# Patient Record
Sex: MALE | Race: WHITE | NOT HISPANIC OR LATINO | Employment: FULL TIME | URBAN - METROPOLITAN AREA
[De-identification: names, ages, dates, MRNs, and addresses within clinical notes are randomized per-mention and may not be internally consistent; named-entity substitution may affect disease eponyms.]

---

## 2019-01-06 ENCOUNTER — APPOINTMENT (OUTPATIENT)
Dept: RADIOLOGY | Facility: MEDICAL CENTER | Age: 24
End: 2019-01-06
Attending: EMERGENCY MEDICINE
Payer: COMMERCIAL

## 2019-01-06 ENCOUNTER — HOSPITAL ENCOUNTER (EMERGENCY)
Facility: MEDICAL CENTER | Age: 24
End: 2019-01-06
Attending: EMERGENCY MEDICINE
Payer: COMMERCIAL

## 2019-01-06 VITALS
BODY MASS INDEX: 17.54 KG/M2 | HEIGHT: 75 IN | RESPIRATION RATE: 16 BRPM | HEART RATE: 66 BPM | TEMPERATURE: 98.2 F | OXYGEN SATURATION: 100 % | WEIGHT: 141.09 LBS | DIASTOLIC BLOOD PRESSURE: 65 MMHG | SYSTOLIC BLOOD PRESSURE: 122 MMHG

## 2019-01-06 DIAGNOSIS — G43.009 MIGRAINE WITHOUT AURA AND WITHOUT STATUS MIGRAINOSUS, NOT INTRACTABLE: ICD-10-CM

## 2019-01-06 DIAGNOSIS — R51.9 NONINTRACTABLE EPISODIC HEADACHE, UNSPECIFIED HEADACHE TYPE: ICD-10-CM

## 2019-01-06 LAB
ALBUMIN SERPL BCP-MCNC: 4.3 G/DL (ref 3.2–4.9)
ALBUMIN/GLOB SERPL: 1.4 G/DL
ALP SERPL-CCNC: 78 U/L (ref 30–99)
ALT SERPL-CCNC: 20 U/L (ref 2–50)
ANION GAP SERPL CALC-SCNC: 10 MMOL/L (ref 0–11.9)
AST SERPL-CCNC: 23 U/L (ref 12–45)
BASOPHILS # BLD AUTO: 0.9 % (ref 0–1.8)
BASOPHILS # BLD: 0.07 K/UL (ref 0–0.12)
BILIRUB SERPL-MCNC: 1.3 MG/DL (ref 0.1–1.5)
BUN SERPL-MCNC: 14 MG/DL (ref 8–22)
CALCIUM SERPL-MCNC: 9.3 MG/DL (ref 8.4–10.2)
CHLORIDE SERPL-SCNC: 105 MMOL/L (ref 96–112)
CO2 SERPL-SCNC: 20 MMOL/L (ref 20–33)
CREAT SERPL-MCNC: 0.89 MG/DL (ref 0.5–1.4)
EOSINOPHIL # BLD AUTO: 0.12 K/UL (ref 0–0.51)
EOSINOPHIL NFR BLD: 1.5 % (ref 0–6.9)
ERYTHROCYTE [DISTWIDTH] IN BLOOD BY AUTOMATED COUNT: 37.5 FL (ref 35.9–50)
GLOBULIN SER CALC-MCNC: 3 G/DL (ref 1.9–3.5)
GLUCOSE SERPL-MCNC: 80 MG/DL (ref 65–99)
HCT VFR BLD AUTO: 42.4 % (ref 42–52)
HGB BLD-MCNC: 14.9 G/DL (ref 14–18)
IMM GRANULOCYTES # BLD AUTO: 0.02 K/UL (ref 0–0.11)
IMM GRANULOCYTES NFR BLD AUTO: 0.2 % (ref 0–0.9)
LYMPHOCYTES # BLD AUTO: 1.72 K/UL (ref 1–4.8)
LYMPHOCYTES NFR BLD: 21 % (ref 22–41)
MCH RBC QN AUTO: 28 PG (ref 27–33)
MCHC RBC AUTO-ENTMCNC: 35.1 G/DL (ref 33.7–35.3)
MCV RBC AUTO: 79.7 FL (ref 81.4–97.8)
MONOCYTES # BLD AUTO: 1 K/UL (ref 0–0.85)
MONOCYTES NFR BLD AUTO: 12.2 % (ref 0–13.4)
NEUTROPHILS # BLD AUTO: 5.28 K/UL (ref 1.82–7.42)
NEUTROPHILS NFR BLD: 64.2 % (ref 44–72)
NRBC # BLD AUTO: 0 K/UL
NRBC BLD-RTO: 0 /100 WBC
PLATELET # BLD AUTO: 235 K/UL (ref 164–446)
PMV BLD AUTO: 10.3 FL (ref 9–12.9)
POTASSIUM SERPL-SCNC: 3.5 MMOL/L (ref 3.6–5.5)
PROT SERPL-MCNC: 7.3 G/DL (ref 6–8.2)
RBC # BLD AUTO: 5.32 M/UL (ref 4.7–6.1)
SODIUM SERPL-SCNC: 135 MMOL/L (ref 135–145)
WBC # BLD AUTO: 8.2 K/UL (ref 4.8–10.8)

## 2019-01-06 PROCEDURE — A9270 NON-COVERED ITEM OR SERVICE: HCPCS | Performed by: EMERGENCY MEDICINE

## 2019-01-06 PROCEDURE — 36415 COLL VENOUS BLD VENIPUNCTURE: CPT

## 2019-01-06 PROCEDURE — 700111 HCHG RX REV CODE 636 W/ 250 OVERRIDE (IP): Performed by: EMERGENCY MEDICINE

## 2019-01-06 PROCEDURE — 99284 EMERGENCY DEPT VISIT MOD MDM: CPT

## 2019-01-06 PROCEDURE — 80053 COMPREHEN METABOLIC PANEL: CPT

## 2019-01-06 PROCEDURE — 70450 CT HEAD/BRAIN W/O DYE: CPT

## 2019-01-06 PROCEDURE — 72125 CT NECK SPINE W/O DYE: CPT

## 2019-01-06 PROCEDURE — 85025 COMPLETE CBC W/AUTO DIFF WBC: CPT

## 2019-01-06 PROCEDURE — 700102 HCHG RX REV CODE 250 W/ 637 OVERRIDE(OP): Performed by: EMERGENCY MEDICINE

## 2019-01-06 RX ORDER — OXYCODONE HYDROCHLORIDE AND ACETAMINOPHEN 5; 325 MG/1; MG/1
1 TABLET ORAL ONCE
Status: COMPLETED | OUTPATIENT
Start: 2019-01-06 | End: 2019-01-06

## 2019-01-06 RX ORDER — CETIRIZINE HYDROCHLORIDE, PSEUDOEPHEDRINE HYDROCHLORIDE 5; 120 MG/1; MG/1
1 TABLET, FILM COATED, EXTENDED RELEASE ORAL ONCE
COMMUNITY

## 2019-01-06 RX ORDER — ONDANSETRON 4 MG/1
4 TABLET, ORALLY DISINTEGRATING ORAL ONCE
Status: COMPLETED | OUTPATIENT
Start: 2019-01-06 | End: 2019-01-06

## 2019-01-06 RX ORDER — ZINC ACETATE ANHYDROUS AND ZINC GLUCONATE 2; 1 [HP_X]/1; [HP_X]/1
1 TABLET ORAL ONCE
COMMUNITY

## 2019-01-06 RX ORDER — IBUPROFEN 200 MG
600 TABLET ORAL EVERY 6 HOURS PRN
COMMUNITY

## 2019-01-06 RX ADMIN — ONDANSETRON 4 MG: 4 TABLET, ORALLY DISINTEGRATING ORAL at 12:34

## 2019-01-06 RX ADMIN — OXYCODONE HYDROCHLORIDE AND ACETAMINOPHEN 1 TABLET: 5; 325 TABLET ORAL at 12:34

## 2019-01-06 ASSESSMENT — PAIN SCALES - GENERAL
PAINLEVEL_OUTOF10: 3
PAINLEVEL_OUTOF10: 4

## 2019-01-06 NOTE — ED NOTES
All lines and monitors D/Cd.  Discharge instructions given, questions answered.  Left ER on foot escorted by RN. Pt states all belongings in possession.  Instructed not to drive after pain medication and pt verbalizes understanding.

## 2019-01-06 NOTE — ED NOTES
Med rec completed per pt and mom at bedside  Allergies reviewed - NKDA  No ABX in last 30 days   Pt denies taking any prescription medications

## 2019-01-06 NOTE — ED PROVIDER NOTES
"ED Provider Note  CHIEF COMPLAINT  Chief Complaint   Patient presents with   • Headache       HPI  Orestes Lopez is a 23 y.o. male who presents with intermittent mild to moderate occipital headache over the past several days.  No fever nor stiff neck.  No sinus pain or earache or sore throat.  No trauma.  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  No chest pain or nausea or vomiting.  No cough.  No weakness in his arms or legs.  Pain starts at the top of the neck up into the back of his head.  No stiff neck.  No previous evaluation by a physician.    REVIEW OF SYSTEMS  No chest pain, no difficulty breathing, no nausea or vomiting.  ALL OTHER SYSTEMS NEGATIVE    ALLERGIES  No Known Allergies    CURRENT MEDICATIONS  Negative    PAST MEDICAL HISTORY  Negative    FAMILY HISTORY  Negative    SOCIAL HISTORY  Mother at the bedside    PHYSICAL EXAM  GENERAL: Alert young male adult  VITAL SIGNS: /64   Pulse 78   Temp 36.8 °C (98.2 °F) (Temporal)   Resp 18   Ht 1.905 m (6' 3\")   Wt 64 kg (141 lb 1.5 oz)   SpO2 99%   BMI 17.64 kg/m²    Constitutional: Alert young male adult HENT: Scalp is normal size and nontender. Ears are clear. Nose is clear. Throat is clear with no stridor no drooling no trismus. Teeth are all intact.  Eyes: Pupils equal round and reactive to light, extraocular motor fall. There is no scleral icterus.  Neck: Neck is supple and nontender. There is no meningismus. No adenitis. No thyromegaly.  Lymphatic: No adenopathy.   Cardiovascular: Heart regular rhythm without murmurs or gallops   Thorax & Lungs: No chest wall tenderness. Lungs are clear. Patient has good breath sounds bilateral. No rales, no rhonchi, no wheezes.  Abdomen: Abdomen is soft, nontender, not rigid, no guarding, and no organomegaly. There is no palpable hernia   Skin: Warm, pink, and dry with no erythema and no rash.   Back: Nontender, no midline bony tenderness, no flank tenderness.  Extremities: Full range " of motion  No tenderness to palpation and no deformities noted. No calf or thigh swelling. No calf or thigh tenderness. No clinical DVT.  Neurologic: Alert & oriented . Cranial nerves are grossly intact as tested. Patient moves all 4 extremities well. Patient has good strong flexion and extension of the ankle joints knee joints hip joints and elbow joints. Sensation is normal and symmetrical in the upper and lower extremities.   Psychiatric: Patient is alert oriented coherent and rational.       RADIOLOGY/PROCEDURES  CT-CSPINE WITHOUT PLUS RECONS   Final Result      Negative CT scan of the cervical spine      CT-HEAD W/O   Final Result      1.  No acute intracranial abnormality      2.  Right sphenoid sinus mucosal thickening        COURSE & MEDICAL DECISION MAKING  Patient arrives with a occipital headache starting at the top of his neck.  No history of trauma.  No previous headaches.  No history of migraines.  No photophobia.  Differential diagnosis: Intracranial event, infection, viral meningitis, etc.  Clinically he does not have meningitis nor viral meningitis.  Most likely a cervical spine issue or tension related type headache.  Evaluation will be completed since this is his first time headache.  Vital signs are normal.    Plan: #1 CT of the head and neck #2 lab including CBC, CMP, pro time.    Laboratory and reexamination and ED course:    CT the head is normal.  CT the cervical spine is normal.  CBC is normal no anemia no bandemia.  Chemistry panel is normal.    Results for orders placed or performed during the hospital encounter of 01/06/19   CBC WITH DIFFERENTIAL   Result Value Ref Range    WBC 8.2 4.8 - 10.8 K/uL    RBC 5.32 4.70 - 6.10 M/uL    Hemoglobin 14.9 14.0 - 18.0 g/dL    Hematocrit 42.4 42.0 - 52.0 %    MCV 79.7 (L) 81.4 - 97.8 fL    MCH 28.0 27.0 - 33.0 pg    MCHC 35.1 33.7 - 35.3 g/dL    RDW 37.5 35.9 - 50.0 fL    Platelet Count 235 164 - 446 K/uL    MPV 10.3 9.0 - 12.9 fL    Neutrophils-Polys  64.20 44.00 - 72.00 %    Lymphocytes 21.00 (L) 22.00 - 41.00 %    Monocytes 12.20 0.00 - 13.40 %    Eosinophils 1.50 0.00 - 6.90 %    Basophils 0.90 0.00 - 1.80 %    Immature Granulocytes 0.20 0.00 - 0.90 %    Nucleated RBC 0.00 /100 WBC    Neutrophils (Absolute) 5.28 1.82 - 7.42 K/uL    Lymphs (Absolute) 1.72 1.00 - 4.80 K/uL    Monos (Absolute) 1.00 (H) 0.00 - 0.85 K/uL    Eos (Absolute) 0.12 0.00 - 0.51 K/uL    Baso (Absolute) 0.07 0.00 - 0.12 K/uL    Immature Granulocytes (abs) 0.02 0.00 - 0.11 K/uL    NRBC (Absolute) 0.00 K/uL   COMP METABOLIC PANEL   Result Value Ref Range    Sodium 135 135 - 145 mmol/L    Potassium 3.5 (L) 3.6 - 5.5 mmol/L    Chloride 105 96 - 112 mmol/L    Co2 20 20 - 33 mmol/L    Anion Gap 10.0 0.0 - 11.9    Glucose 80 65 - 99 mg/dL    Bun 14 8 - 22 mg/dL    Creatinine 0.89 0.50 - 1.40 mg/dL    Calcium 9.3 8.4 - 10.2 mg/dL    AST(SGOT) 23 12 - 45 U/L    ALT(SGPT) 20 2 - 50 U/L    Alkaline Phosphatase 78 30 - 99 U/L    Total Bilirubin 1.3 0.1 - 1.5 mg/dL    Albumin 4.3 3.2 - 4.9 g/dL    Total Protein 7.3 6.0 - 8.2 g/dL    Globulin 3.0 1.9 - 3.5 g/dL    A-G Ratio 1.4 g/dL   ESTIMATED GFR   Result Value Ref Range    GFR If African American >60 >60 mL/min/1.73 m 2    GFR If Non African American >60 >60 mL/min/1.73 m 2      Most likely a migraine type headache or tension headache or a musculoskeletal headache.  Stable for discharge.    Home treatment: #1 is been given a copy of all of his reports #2 of urged him to see his family physician tomorrow.  #3 return here if symptoms increase or persist.  FINAL IMPRESSION  1.  Headache tension type versus migraine.      Electronically signed by: Gary Gansert, 1/6/2019 /12:20 PM

## 2019-01-06 NOTE — ED TRIAGE NOTES
Pt is accompanied by his parent.  He states experiencing recurrent occipital headache with associated neck pain for the past week.  He reports moderate photosensitivity, and denies any fever.

## 2024-06-13 ENCOUNTER — OFFICE VISIT (OUTPATIENT)
Dept: URGENT CARE | Facility: PHYSICIAN GROUP | Age: 29
End: 2024-06-13
Payer: COMMERCIAL

## 2024-06-13 VITALS
DIASTOLIC BLOOD PRESSURE: 70 MMHG | BODY MASS INDEX: 19.97 KG/M2 | SYSTOLIC BLOOD PRESSURE: 126 MMHG | OXYGEN SATURATION: 97 % | RESPIRATION RATE: 16 BRPM | HEART RATE: 82 BPM | WEIGHT: 164 LBS | HEIGHT: 76 IN | TEMPERATURE: 99.5 F

## 2024-06-13 DIAGNOSIS — J01.40 ACUTE NON-RECURRENT PANSINUSITIS: ICD-10-CM

## 2024-06-13 DIAGNOSIS — J02.9 PHARYNGITIS, UNSPECIFIED ETIOLOGY: ICD-10-CM

## 2024-06-13 DIAGNOSIS — J45.21 MILD INTERMITTENT ASTHMA WITH ACUTE EXACERBATION: ICD-10-CM

## 2024-06-13 DIAGNOSIS — J06.9 VIRAL URI WITH COUGH: ICD-10-CM

## 2024-06-13 LAB — S PYO DNA SPEC NAA+PROBE: NOT DETECTED

## 2024-06-13 PROCEDURE — 99204 OFFICE O/P NEW MOD 45 MIN: CPT | Performed by: NURSE PRACTITIONER

## 2024-06-13 PROCEDURE — 87651 STREP A DNA AMP PROBE: CPT | Performed by: NURSE PRACTITIONER

## 2024-06-13 PROCEDURE — 3074F SYST BP LT 130 MM HG: CPT | Performed by: NURSE PRACTITIONER

## 2024-06-13 PROCEDURE — 3078F DIAST BP <80 MM HG: CPT | Performed by: NURSE PRACTITIONER

## 2024-06-13 RX ORDER — BENZONATATE 100 MG/1
100 CAPSULE ORAL 3 TIMES DAILY PRN
Qty: 30 CAPSULE | Refills: 0 | Status: SHIPPED | OUTPATIENT
Start: 2024-06-13 | End: 2024-06-23

## 2024-06-13 RX ORDER — ALBUTEROL SULFATE 90 UG/1
2 AEROSOL, METERED RESPIRATORY (INHALATION) EVERY 6 HOURS PRN
Qty: 8.5 G | Refills: 0 | Status: SHIPPED | OUTPATIENT
Start: 2024-06-13

## 2024-06-13 RX ORDER — DEXTROMETHORPHAN HYDROBROMIDE AND PROMETHAZINE HYDROCHLORIDE 15; 6.25 MG/5ML; MG/5ML
5 SYRUP ORAL EVERY 6 HOURS PRN
Qty: 118 ML | Refills: 0 | Status: SHIPPED | OUTPATIENT
Start: 2024-06-13 | End: 2024-06-20

## 2024-06-13 RX ORDER — AMOXICILLIN AND CLAVULANATE POTASSIUM 875; 125 MG/1; MG/1
1 TABLET, FILM COATED ORAL 2 TIMES DAILY
Qty: 20 TABLET | Refills: 0 | Status: SHIPPED | OUTPATIENT
Start: 2024-06-13 | End: 2024-06-23

## 2024-06-13 RX ORDER — PREDNISONE 20 MG/1
40 TABLET ORAL DAILY
Qty: 10 TABLET | Refills: 0 | Status: SHIPPED | OUTPATIENT
Start: 2024-06-13 | End: 2024-06-18

## 2024-06-13 NOTE — LETTER
June 13, 2024              To whom it may concern:       Orestes Lopez was seen in the urgent care on  6/13/24 .  Please excuse from work. May return to work once afebrile for 24 hours ( without the use of tylenol or ibuprofen)  and feeling generally better.     This is the only note that will be provided for this visit.  Your employee will require an appointment with a primary care provider if FMLA or disability forms are required.           SHAYLA Mariano.

## 2024-06-13 NOTE — PROGRESS NOTES
Verbal consent was acquired by the patient to use [employer] ambient listening note generation during this visit     Date: 06/13/24        Chief Complaint   Patient presents with    Pharyngitis     X4days. Congestion, chills, wet cough, fatigue          History of Present Illness  The patient is a 28-year-old male presenting to the clinic with sore throat and cough.    The patient's sore throat commenced on Saturday, with the symptoms intensifying by Sunday. He reports no symptoms of nausea, vomiting, or diarrhea. Nasal congestion is present, particularly on the right side, accompanied by swelling. He has been self-medicating with Tylenol and NyQuil, which aids in sleep for approximately 2 hours. He experiences intermittent ear pain, with the most recent episode occurring on Monday and Tuesday. He has a history of asthma, but has not required medication for it since the age of [age]. He denies experiencing wheezing, but reports frequent coughing fits that necessitate breathlessness. He also reports intermittent fevers, typically at night, and coldness by morning.         ROS:    No severe shortness of breath   No Cardiac like chest pain, as discussed   As otherwise stated in HPI    Medical/SX/ Social History:  Reviewed per chart    Pertinent Medications:    Current Outpatient Medications on File Prior to Visit   Medication Sig Dispense Refill    DM-Doxylamine-Acetaminophen (NYQUIL COLD & FLU PO) Take  by mouth.      ibuprofen (MOTRIN) 200 MG Tab Take 600 mg by mouth every 6 hours as needed for Mild Pain or Headache. (Patient not taking: Reported on 6/13/2024)      Homeopathic Products (ZICAM COLD REMEDY) TABLET DISPERSIBLE Take 1 Tab by mouth Once. One time dose 1/5/19 (Patient not taking: Reported on 6/13/2024)      cetirizine-psuedoephedrine (ZYRTEC-D ALLERGY & CONGESTION) 5-120 MG per tablet Take 1 Tab by mouth Once. One time dose on 1/5/19 (Patient not taking: Reported on 6/13/2024)       No current  facility-administered medications on file prior to visit.        Allergies:    Patient has no known allergies.     Problem list, medications, and allergies reviewed by myself today in Epic     Physical Exam:    Vitals:    06/13/24 0908   BP: 126/70   Pulse: 82   Resp: 16   Temp: 37.5 °C (99.5 °F)   SpO2: 97%             Physical Exam  Constitutional:       General: He is awake.      Appearance: Normal appearance. He is not ill-appearing, toxic-appearing or diaphoretic.   HENT:      Head: Normocephalic and atraumatic.      Right Ear: Tympanic membrane, ear canal and external ear normal.      Left Ear: Tympanic membrane, ear canal and external ear normal.      Nose: Rhinorrhea present. Rhinorrhea is clear.      Right Turbinates: Swollen.      Right Sinus: Maxillary sinus tenderness and frontal sinus tenderness present.      Mouth/Throat:      Lips: Pink.      Mouth: Mucous membranes are moist.      Tongue: No lesions.      Palate: No lesions.      Pharynx: Pharyngeal swelling and posterior oropharyngeal erythema present. No oropharyngeal exudate or uvula swelling.      Tonsils: No tonsillar exudate or tonsillar abscesses. 2+ on the right. 2+ on the left.   Eyes:      General: Lids are normal. Gaze aligned appropriately. No allergic shiner or scleral icterus.     Extraocular Movements: Extraocular movements intact.      Conjunctiva/sclera: Conjunctivae normal.      Pupils: Pupils are equal, round, and reactive to light.   Cardiovascular:      Rate and Rhythm: Normal rate and regular rhythm.      Pulses:           Radial pulses are 2+ on the right side and 2+ on the left side.      Heart sounds: Normal heart sounds.   Pulmonary:      Effort: Pulmonary effort is normal.      Breath sounds: Normal breath sounds and air entry. No decreased breath sounds, wheezing, rhonchi or rales.   Abdominal:      General: Abdomen is flat. Bowel sounds are normal.      Palpations: Abdomen is soft.      Tenderness: There is no abdominal  tenderness.   Musculoskeletal:      Right lower leg: No edema.      Left lower leg: No edema.   Lymphadenopathy:      Cervical: No cervical adenopathy.   Skin:     General: Skin is warm.      Capillary Refill: Capillary refill takes less than 2 seconds.      Coloration: Skin is not cyanotic or pale.   Neurological:      Mental Status: He is alert and oriented to person, place, and time.      Gait: Gait is intact.   Psychiatric:         Behavior: Behavior normal. Behavior is cooperative.                  Diagnostics:    Recent Results (from the past 2 hour(s))   POCT CEPHEID GROUP A STREP - PCR    Collection Time: 06/13/24  9:27 AM   Result Value Ref Range    POC Group A Strep, PCR Not Detected Not Detected, Invalid          Medical Decision making and plan :  I personally reviewed prior external notes and test results pertinent to today's visit. Pt is clinically stable at today's acute urgent care visit.  Patient appears nontoxic with no acute distress noted. Appropriate for outpatient care at this time.    Pleasant 28 y.o. male presented clinic with HPI exam findings consistent with viral URI.  Fortunately all testing is negative.  No noted bacterial foci on exam that would indicate any antibiotics at this time.  Discussed self-limiting nature of a viral illness as well as gave anticipatory guidance for postviral cough.  Did send continue antibiotics for acute bacterial sinusitis.  Advised that if sinus congestion pain pressure is not improving on day 10 of illness he may start the antibiotics at that time.  Due to patient's asthma and coughing fits.  Will treat with 5-day burst of prednisone for chronic asthma with acute exacerbation.  Also did send an albuterol inhaler.    Assessment & Plan      1. Pharyngitis, unspecified etiology    - POCT CEPHEID GROUP A STREP - PCR    2. Acute non-recurrent pansinusitis    - amoxicillin-clavulanate (AUGMENTIN) 875-125 MG Tab; Take 1 Tablet by mouth 2 times a day for 10 days.   Dispense: 20 Tablet; Refill: 0    3. Mild intermittent asthma with acute exacerbation    - predniSONE (DELTASONE) 20 MG Tab; Take 2 Tablets by mouth every day for 5 days.  Dispense: 10 Tablet; Refill: 0  - albuterol 108 (90 Base) MCG/ACT Aero Soln inhalation aerosol; Inhale 2 Puffs every 6 hours as needed for Shortness of Breath.  Dispense: 8.5 g; Refill: 0    4. Viral URI with cough    - benzonatate (TESSALON) 100 MG Cap; Take 1 Capsule by mouth 3 times a day as needed for Cough for up to 10 days.  Dispense: 30 Capsule; Refill: 0  - promethazine-dextromethorphan (PROMETHAZINE-DM) 6.25-15 MG/5ML syrup; Take 5 mL by mouth every 6 hours as needed for Cough for up to 7 days. (caution: may cause sedation)  Dispense: 118 mL; Refill: 0     Shared decision-making was utilized with patient for treatment plan. Medication discussed included indication for use and the potential benefits and side effects. Education was provided regarding the aforementioned assessments.  Differential Diagnosis, natural history, and supportive care discussed. All of the patient's questions were answered to their satisfaction at the time of discharge. Patient was encouraged to monitor symptoms closely. Those signs and symptoms which would warrant concern and mandate seeking a higher level of service through the emergency department discussed at length.  Patient stated agreement and understanding of this plan of care.    Disposition:  Home in stable condition       Voice Recognition Disclaimer:  Portions of this document were created using voice recognition software. The software does have a chance of producing errors of grammar and possibly content. I have made every reasonable attempt to correct obvious errors, but there may be errors of grammar and possibly content that I did not discover before finalizing the documentation.    SHAYLA Mariano.

## 2025-01-14 ENCOUNTER — OCCUPATIONAL MEDICINE (OUTPATIENT)
Dept: URGENT CARE | Facility: CLINIC | Age: 30
End: 2025-01-14
Payer: COMMERCIAL

## 2025-01-14 ENCOUNTER — HOSPITAL ENCOUNTER (OUTPATIENT)
Dept: RADIOLOGY | Facility: MEDICAL CENTER | Age: 30
End: 2025-01-14
Payer: COMMERCIAL

## 2025-01-14 ENCOUNTER — NON-PROVIDER VISIT (OUTPATIENT)
Dept: URGENT CARE | Facility: CLINIC | Age: 30
End: 2025-01-14

## 2025-01-14 VITALS
BODY MASS INDEX: 23.33 KG/M2 | SYSTOLIC BLOOD PRESSURE: 128 MMHG | HEART RATE: 77 BPM | RESPIRATION RATE: 16 BRPM | DIASTOLIC BLOOD PRESSURE: 80 MMHG | WEIGHT: 191.58 LBS | HEIGHT: 76 IN | TEMPERATURE: 98.4 F | OXYGEN SATURATION: 98 %

## 2025-01-14 DIAGNOSIS — K40.90 UNILATERAL INGUINAL HERNIA WITHOUT OBSTRUCTION OR GANGRENE, RECURRENCE NOT SPECIFIED: ICD-10-CM

## 2025-01-14 DIAGNOSIS — Z02.83 ENCOUNTER FOR DRUG SCREENING: ICD-10-CM

## 2025-01-14 LAB
AMP AMPHETAMINE: NORMAL
APPEARANCE UR: NORMAL
BILIRUB UR STRIP-MCNC: NORMAL MG/DL
COC COCAINE: NORMAL
COLOR UR AUTO: NORMAL
GLUCOSE UR STRIP.AUTO-MCNC: NORMAL MG/DL
INT CON NEG: NORMAL
INT CON POS: NORMAL
KETONES UR STRIP.AUTO-MCNC: NORMAL MG/DL
LEUKOCYTE ESTERASE UR QL STRIP.AUTO: NORMAL
MET METHAMPHETAMINES: NORMAL
NITRITE UR QL STRIP.AUTO: NORMAL
OPI OPIATES: NORMAL
PCP PHENCYCLIDINE: NORMAL
PH UR STRIP.AUTO: 7 [PH] (ref 5–8)
POC DRUG COMMENT 753798-OCCUPATIONAL HEALTH: NORMAL
PROT UR QL STRIP: NORMAL MG/DL
RBC UR QL AUTO: NORMAL
SP GR UR STRIP.AUTO: 1.02
THC: NORMAL
UROBILINOGEN UR STRIP-MCNC: 1 MG/DL

## 2025-01-14 PROCEDURE — 99214 OFFICE O/P EST MOD 30 MIN: CPT

## 2025-01-14 PROCEDURE — 76857 US EXAM PELVIC LIMITED: CPT

## 2025-01-14 PROCEDURE — 81002 URINALYSIS NONAUTO W/O SCOPE: CPT

## 2025-01-14 PROCEDURE — 3079F DIAST BP 80-89 MM HG: CPT

## 2025-01-14 PROCEDURE — 80305 DRUG TEST PRSMV DIR OPT OBS: CPT

## 2025-01-14 PROCEDURE — 3074F SYST BP LT 130 MM HG: CPT

## 2025-01-14 RX ORDER — KETOROLAC TROMETHAMINE 15 MG/ML
15 INJECTION, SOLUTION INTRAMUSCULAR; INTRAVENOUS ONCE
Status: COMPLETED | OUTPATIENT
Start: 2025-01-14 | End: 2025-01-14

## 2025-01-14 RX ORDER — CYCLOBENZAPRINE HCL 10 MG
10 TABLET ORAL NIGHTLY PRN
Qty: 5 TABLET | Refills: 0 | Status: SHIPPED | OUTPATIENT
Start: 2025-01-14 | End: 2025-01-19

## 2025-01-14 RX ADMIN — KETOROLAC TROMETHAMINE 15 MG: 15 INJECTION, SOLUTION INTRAMUSCULAR; INTRAVENOUS at 14:54

## 2025-01-14 ASSESSMENT — ENCOUNTER SYMPTOMS
DIARRHEA: 0
PALPITATIONS: 0
FEVER: 0
COUGH: 0
DIZZINESS: 0
VOMITING: 0
MYALGIAS: 0
ABDOMINAL PAIN: 0
FALLS: 0
NAUSEA: 0
FOCAL WEAKNESS: 0
LOSS OF CONSCIOUSNESS: 0
SHORTNESS OF BREATH: 0
CHILLS: 0
WEAKNESS: 0
BLURRED VISION: 0

## 2025-01-14 NOTE — LETTER
"    EMPLOYEE’S CLAIM FOR COMPENSATION/ REPORT OF INITIAL TREATMENT  FORM C-4  PLEASE TYPE OR PRINT    EMPLOYEE’S CLAIM - PROVIDE ALL INFORMATION REQUESTED   First Name                    JADE Carlisle                  Last Name  Jessica Birthdate                    1995                Sex  Male Claim Number (Insurer’s Use Only)     Home Address  [address] Age  29 y.o. 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-PARTY   [employer]   Employee's Occupation (Job Title) When Injury or Occupational Disease Occurred      Employer's Name/Company Name  [employer]  Telephone  [phone]    Office Mail Address (Number and Street)  [facility]     Date of Injury (if applicable) 1/14/2025               Hours Injury (if applicable)  11:45 AM Date Employer Notified  1/14/2025 Last Day of Work after Injury or Occupational Disease  1/14/2025 Supervisor to Whom Injury Reported  Natalie Strait   Address or Location of Accident (if applicable)  Work [1]   What were you doing at the time of accident? (if applicable)  lift object    How did this injury or occupational disease occur? (Be specific and answer in detail. Use additional sheet if necessary)  Lifting an object up to an elevated position and felt pain   If you believe that you have an occupational disease, when did you first have knowledge of the disability and its relationship to your employment?   Witnesses to the Accident (if applicable)  Mirza Mcclain      Nature of Injury or Occupational Disease  Workers' Compensation  Part(s) of Body Injured or Affected  Abdomen Including Groin N/A N/A    I CERTIFY " THAT THE ABOVE IS TRUE AND CORRECT TO T HE BEST OF MY KNOWLEDGE AND THAT I HAVE PROVIDED THIS INFORMATION IN ORDER TO OBTAIN THE BENEFITS OF [place], OR [place]  I HEREBY AUTHORIZE ANY PHYSICIAN, CHIROPRACTOR, SURGEON, PRACTITIONER OR ANY OTHER PERSON, ANY HOSPITAL, INCLUDING [facility] OR [facility], ANY  MEDICAL SERVICE ORGANIZATION, ANY INSURANCE COMPANY, OR OTHER INSTITUTION OR ORGANIZATION TO RELEASE TO EACH OTHER, ANY MEDICAL OR OTHER INFORMATION, INCLUDING BENEFITS PAID OR PAYABLE, PERTINENT TO THIS INJURY OR DISEASE, EXCEPT INFORMATION RELATIVE TO DIAGNOSIS, TREATMENT AND/OR COUNSELING FOR AIDS, PSYCHOLOGICAL CONDITIONS, ALCOHOL OR CONTROLLED SUBSTANCES, FOR WHICH I MUST GIVE SPECIFIC AUTHORIZATION.  A PHOTOSTAT OF THIS AUTHORIZATION SHALL BE VALID AS THE ORIGINAL.     Date   Place Employee’s Original or  *Electronic Signature   THIS REPORT MUST BE COMPLETED AND MAILED WITHIN 3 WORKING DAYS OF TREATMENT   Place  [facility]    Name of Facility  [facility]   Date 1/14/2025 Diagnosis and Description of Injury or Occupational Disease  (K40.90) Unilateral inguinal hernia without obstruction or gangrene, recurrence not specified  The encounter diagnosis was Unilateral inguinal hernia without obstruction or gangrene, recurrence not specified. Is there evidence that the injured employee was under the influence of alcohol and/or another controlled substance at the time of accident?  []No  [] Yes (if yes, please explain)   Hour 2:11 PM  No   Treatment: Muscle relaxer and pain management     Have you advised the patient to remain off work five days or more?   [] Yes Indicate dates: From   To    [] No      If no, is the injured employee capable of: [] full duty [] modified duty                     If modified duty, specify any limitations / restrictions:  Sitting tasks                                                                                                                                                                                                                                                                                                                                                                                                                X-Ray Findings:      From information given by the employee, together with medical evidence, can you directly connect this injury or occupational disease as job incurred?  []Yes   [] No Yes    Is additional medical care by a physician indicated? []Yes [] No  Yes    Do you know of any previous injury or disease contributing to this condition or occupational disease? []Yes [] No (Explain if yes)                          No   Date  1/14/2025 Print Health Care Provider’s Name  Jasmine Noble M.D. I certify that the employer’s copy of  this form was delivered to the employer on:   Address  [address], [facility] [address] INSURER'S USE ONLY                       [place] Zip  [address] Provider’s Tax ID Number  [id number]   Telephone  Dept: [phone]    Health Care Provider’s Original or Electronic Signature  e-JASMINE Beasley M.D. Degree (MD,DO, DC,PA-C,APRN)  MD  Choose (if applicable)      ORIGINAL - TREATING HEALTHCARE PROVIDER PAGE 2 - INSURER/TPA PAGE 3 - EMPLOYER PAGE 4 - EMPLOYEE             Form C-4 (rev.08/23)

## 2025-01-14 NOTE — PROGRESS NOTES
Subjective:     Orestes Lopez is a 29 y.o. male who presents for Work-Related Injury (Pt states he was lifting a box and left pain in his lower left abdominal and pain in his left testicle. ) and Testicle Pain      Groin Pain  This is a new problem. The current episode started today. The problem has been gradually worsening. Pertinent negatives include no abdominal pain, chest pain, chills, coughing, fever, myalgias, nausea, rash, vomiting or weakness. The symptoms are aggravated by bending, coughing and exertion. He has tried rest for the symptoms. The treatment provided no relief.       Review of Systems   Constitutional:  Negative for chills, fever and malaise/fatigue.   HENT:  Negative for ear discharge.    Eyes:  Negative for blurred vision.   Respiratory:  Negative for cough and shortness of breath.    Cardiovascular:  Negative for chest pain, palpitations and leg swelling.   Gastrointestinal:  Negative for abdominal pain, diarrhea, nausea and vomiting.   Musculoskeletal:  Negative for falls and myalgias.   Skin:  Negative for itching and rash.   Neurological:  Negative for dizziness, focal weakness, loss of consciousness and weakness.        CURRENT MEDICATIONS:  albuterol Aers  cetirizine-psuedoephedrine  ibuprofen Tabs  NYQUIL COLD & FLU PO  Zicam Cold Remedy Tbdp    Allergies:   No Known Allergies    Current Problems: Orestes Lopez does not have a problem list on file.  Past Surgical Hx:  No past surgical history on file.   Past Social Hx:  reports that he has never smoked. He has never used smokeless tobacco. He reports that he does not currently use alcohol. He reports that he does not use drugs.   Past Family Hx:  Orestes Lopez family history is not on file.     (Allergies, Medications, & Tobacco/Substance Use were reconciled by the Medical Assistant and reviewed by myself. The family history is prepopulated)       Objective:     /80   Pulse 77   Temp 36.9 °C (98.4 °F) (Temporal)   Resp  "16   Ht 1.93 m (6' 4\")   Wt 86.9 kg (191 lb 9.3 oz)   SpO2 98%   BMI 23.32 kg/m²     Physical Exam  Constitutional:       General: He is not in acute distress.     Appearance: Normal appearance. He is not ill-appearing or toxic-appearing.   HENT:      Head: Normocephalic and atraumatic.      Nose: No congestion or rhinorrhea.   Eyes:      General: No scleral icterus.        Right eye: No discharge.         Left eye: No discharge.   Cardiovascular:      Rate and Rhythm: Normal rate and regular rhythm.   Pulmonary:      Effort: Pulmonary effort is normal. No respiratory distress.   Abdominal:      General: Abdomen is flat.      Palpations: Abdomen is soft.      Hernia: A hernia is present. Hernia is present in the left inguinal area. There is no hernia in the right inguinal area.   Genitourinary:     Pubic Area: No rash.       Testes: Normal.         Right: Mass, tenderness, swelling, testicular hydrocele or varicocele not present.         Left: Mass, tenderness, swelling, testicular hydrocele or varicocele not present.      Epididymis:      Right: Normal.      Left: Normal.   Musculoskeletal:         General: Normal range of motion.      Cervical back: Normal range of motion.   Lymphadenopathy:      Lower Body: No right inguinal adenopathy. No left inguinal adenopathy.   Neurological:      General: No focal deficit present.      Mental Status: He is alert and oriented to person, place, and time. Mental status is at baseline.   Psychiatric:         Behavior: Behavior normal.         Judgment: Judgment normal.         Assessment/Plan:     Orestes was seen today for work-related injury and testicle pain.    Diagnoses and all orders for this visit:    Unilateral inguinal hernia without obstruction or gangrene, recurrence not specified  -     US-INGUINAL HERNIA; Future  -     ketorolac (Toradol) 15 MG/ML injection 15 mg  -     cyclobenzaprine (FLEXERIL) 10 mg Tab; Take 1 Tablet by mouth at bedtime as needed for Moderate " Pain for up to 5 days.     Based on exam, inguinal hernia present, easily reducible with no signs of incarceration, however significant pain warrants ultrasound to assess fully.  Pain management with Toradol clinic as well as external as needed.  Can return to work with restrictions as noted.  Follow-up in 1 week.  Differential diagnosis, natural history, supportive care, and indications for immediate follow-up discussed.    Advised the patient to follow-up with the primary care physician for recheck, reevaluation, and consideration of further management.    Please note that this dictation was created using voice recognition software. I have made reasonable attempt to correct obvious errors, but I expect that there are errors of grammar and possibly content that I did not discover before finalizing the note.    This note was electronically signed by Jasmine Noble MD PhD

## 2025-01-14 NOTE — LETTER
PHYSICIAN’S AND CHIROPRACTIC PHYSICIAN'S   PROGRESS REPORT   CERTIFICATION OF DISABILITY Claim Number:     Social Security Number:    Patient’s Name: Orestes Lopez Date of Injury: 1/14/2025   Employer: [employer] Name of MCO (if applicable):      Patient’s Job Description/Occupation:        Previous Injuries/Diseases/Surgeries Contributing to the Condition:  none      Diagnosis: (K40.90) Unilateral inguinal hernia without obstruction or gangrene, recurrence not specified      Related to the Industrial Injury? Yes     Explain:        Objective Medical Findings: Inguinal hernia on exam, no signs of incarceration or testiticular torsion          None - Discharged                         Stable  No                 Ratable  No     X   Generally Improved                         Condition Worsened               X   Condition Same  May Have Suffered a Permanent Disability No     Treatment Plan:    NSAIDs and acetaminophen for pain  Muscle relaxers         No Change in Therapy                  PT/OT Prescribed                      Medication May be Used While Working        Case Management                          PT/OT Discontinued    Consultation    Further Diagnostic Studies:    Prescription(s)                 Released to FULL DUTY /No Restrictions on (Date):       Certified TOTALLY TEMPORARILY DISABLED (Indicate Dates) From:   To:    X  Released to RESTRICTED/Modified Duty on (Date): From:   To:    Restrictions Are:         No Sitting    No Standing X   No Pulling Other:     X    No Bending at Waist X    No Stooping X    No Lifting    X    No Carrying     No Walking Lifting Restricted to (lbs.):       X   No Pushing     X   No Climbing  X   No Reaching Above Shoulders       Date of Next Visit:  1/21/2025 Date of this Exam: 1/14/2025 Physician/Chiropractic Physician Name: Jasmine Noble M.D. Physician/Chiropractic Physician Signature:  Bhavik Donahue DO MPH     [place]:  [address] [place] [address]  [place], [address] [place], [place] [address] - Telephone [phone] [place]:  [address] [facility], [address] [place], [place] [address] - Telephone [phone]    [email or web address]  D-39 (Rev. 10/24)

## 2025-01-21 ENCOUNTER — OCCUPATIONAL MEDICINE (OUTPATIENT)
Dept: URGENT CARE | Facility: CLINIC | Age: 30
End: 2025-01-21
Payer: COMMERCIAL

## 2025-01-21 VITALS
DIASTOLIC BLOOD PRESSURE: 68 MMHG | TEMPERATURE: 98.3 F | RESPIRATION RATE: 14 BRPM | BODY MASS INDEX: 23.14 KG/M2 | HEIGHT: 76 IN | HEART RATE: 72 BPM | OXYGEN SATURATION: 97 % | SYSTOLIC BLOOD PRESSURE: 116 MMHG | WEIGHT: 190.04 LBS

## 2025-01-21 DIAGNOSIS — K40.90 UNILATERAL INGUINAL HERNIA WITHOUT OBSTRUCTION OR GANGRENE, RECURRENCE NOT SPECIFIED: ICD-10-CM
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
## 2025-01-21 PROCEDURE — 99213 OFFICE O/P EST LOW 20 MIN: CPT

## 2025-01-21 PROCEDURE — 1126F AMNT PAIN NOTED NONE PRSNT: CPT

## 2025-01-21 PROCEDURE — 3078F DIAST BP <80 MM HG: CPT

## 2025-01-21 PROCEDURE — 3074F SYST BP LT 130 MM HG: CPT

## 2025-01-21 ASSESSMENT — ENCOUNTER SYMPTOMS
ROS GI COMMENTS: HERNIA
ABDOMINAL PAIN: 0
NECK PAIN: 0
CONSTIPATION: 0
FALLS: 0
BACK PAIN: 0
MYALGIAS: 0

## 2025-01-21 ASSESSMENT — PAIN SCALES - GENERAL: PAINLEVEL_OUTOF10: NO PAIN

## 2025-01-21 NOTE — LETTER
PHYSICIAN’S AND CHIROPRACTIC PHYSICIAN'S   PROGRESS REPORT   CERTIFICATION OF DISABILITY Claim Number:     Social Security Number:    Patient’s Name: Orestes Lopez Date of Injury: 1/14/2025   Employer: [employer] Name of MCO (if applicable):      Patient’s Job Description/Occupation:        Previous Injuries/Diseases/Surgeries Contributing to the Condition:  N/A      Diagnosis: (K40.90) Unilateral inguinal hernia without obstruction or gangrene, recurrence not specified  (Y99.0) Work related injury      Related to the Industrial Injury? Yes     Explain: Sustained while lifting at work      Objective Medical Findings: Mild left lower quadrant tenderness.  No incarcerated hernia appreciated or felt         None - Discharged                         Stable  Yes                 Ratable  No     X   Generally Improved                         Condition Worsened                  Condition Same  May Have Suffered a Permanent Disability No     Treatment Plan:    Referral to general surgery for evaluation  Continue Tylenol/ibuprofen as needed for any pain  Will update work restrictions  Follow-up in 1 week         No Change in Therapy                  PT/OT Prescribed                      Medication May be Used While Working        Case Management                          PT/OT Discontinued  X  Consultation    Further Diagnostic Studies:    Prescription(s) General surgery for hernia repair consultation               Released to FULL DUTY /No Restrictions on (Date):       Certified TOTALLY TEMPORARILY DISABLED (Indicate Dates) From:   To:    X  Released to RESTRICTED/Modified Duty on (Date): From:   To:    Restrictions Are:  Temporary      No Sitting    No Standing    No Pulling Other:         No Bending at Waist     No Stooping X    No Lifting        No Carrying     No Walking Lifting Restricted to (lbs.):  < or = to 25 pounds       No Pushing        No Climbing     No Reaching Above Shoulders        Date of Next Visit:  1/28/2025 Date of this Exam: 1/21/2025 Physician/Chiropractic Physician Name: MARGARITO Villanueva Physician/Chiropractic Physician Signature:  Bhavik Donahue DO MPH     [place]:  [facility]  [facility], [address] [place], [place] [address] - Telephone [phone] [place]:  [address] [place] [address], [address] [place], [place] [address] - Telephone [phone]    [email or web address]  D-39 (Rev. 10/24)

## 2025-01-21 NOTE — PROGRESS NOTES
Subjective:     Orestes Lopez is a 29 y.o. male who presents for Work-Related Injury ([employer] W/C DOI 1/14/25 left Abdomen and Groin pain)    DOI 1/14/2025    This is patient's second visit first follow-up visit for a work-related injury in which she was found to have a left inguinal hernia.  Patient initially was not lifting up objects at work when he felt sudden acute pain.  Initial evaluation did show reducible hernia with no strangulation or incarceration.  Patient had ultrasound follow-up which confirmed left inguinal hernia.  Patient states that since initial visit has been taking medication and resting, states symptoms slowly got better until approximately 48 hours ago symptoms had what seemed to be complete resolution.  He denies any new symptoms, no constipation, no abdominal pain, no testicular pain.       PMH:   No pertinent past medical history to this problem  MEDS:  Medications were reviewed in EMR  ALLERGIES:  Allergies were reviewed in EMR  SOCHX:  Works as a   FH:   No pertinent family history to this problem    Review of Systems   Gastrointestinal:  Negative for abdominal pain and constipation.        Hernia   Genitourinary:  Negative for dysuria.   Musculoskeletal:  Negative for back pain, falls, joint pain, myalgias and neck pain.   All other systems reviewed and are negative.         Objective:     Physical Exam  Vitals reviewed.   Constitutional:       General: He is not in acute distress.     Appearance: Normal appearance. He is normal weight. He is not ill-appearing, toxic-appearing or diaphoretic.   HENT:      Head: Normocephalic and atraumatic.   Abdominal:      Hernia: A hernia is present. Hernia is present in the left inguinal area.          Comments: Slight tenderness with deep palpation the above marked area.  There is a very small hernia felt though nonstrangulated and nonincarcerated   Neurological:      Mental Status: He is alert.         /68   Pulse 72    "Temp 36.8 °C (98.3 °F) (Temporal)   Resp 14   Ht 1.93 m (6' 4\")   Wt 86.2 kg (190 lb 0.6 oz)   SpO2 97%   BMI 23.13 kg/m²     Mild left lower quadrant tenderness.  No incarcerated hernia appreciated or felt    Assessment/Plan:       1. Unilateral inguinal hernia without obstruction or gangrene, recurrence not specified  - Referral to General Surgery    2. Work related injury  - Referral to General Surgery     D39 completed   Patient overall presents very well in clinic and has had significant improvement in his symptoms.  Discussed findings with patient as well as typical hernia progression.  I did advise that patient be evaluated by general surgeon to see if surgical repair is warranted or necessary.  Patient is on board with this plan of care   Will plan to lessen work restrictions with parameters being set no lifting of 25 pounds or more and to monitor symptoms.  Discussed proper body mechanics including lifting and minimizing pressure on affected area  Follow-up 1 week from today    Differential diagnosis, natural history, supportive care, and indications for immediate follow-up discussed.    "

## 2025-01-28 ENCOUNTER — OCCUPATIONAL MEDICINE (OUTPATIENT)
Dept: URGENT CARE | Facility: CLINIC | Age: 30
End: 2025-01-28
Payer: COMMERCIAL

## 2025-01-28 VITALS
HEART RATE: 70 BPM | OXYGEN SATURATION: 99 % | DIASTOLIC BLOOD PRESSURE: 70 MMHG | WEIGHT: 190.7 LBS | HEIGHT: 76 IN | RESPIRATION RATE: 16 BRPM | SYSTOLIC BLOOD PRESSURE: 122 MMHG | TEMPERATURE: 98.3 F | BODY MASS INDEX: 23.22 KG/M2

## 2025-01-28 DIAGNOSIS — K40.90 UNILATERAL INGUINAL HERNIA WITHOUT OBSTRUCTION OR GANGRENE, RECURRENCE NOT SPECIFIED: ICD-10-CM

## 2025-01-28 PROCEDURE — 3078F DIAST BP <80 MM HG: CPT | Performed by: NURSE PRACTITIONER

## 2025-01-28 PROCEDURE — 3074F SYST BP LT 130 MM HG: CPT | Performed by: NURSE PRACTITIONER

## 2025-01-28 PROCEDURE — 99213 OFFICE O/P EST LOW 20 MIN: CPT | Performed by: NURSE PRACTITIONER

## 2025-01-28 ASSESSMENT — VISUAL ACUITY: OU: 1

## 2025-01-28 NOTE — LETTER
PHYSICIAN’S AND CHIROPRACTIC PHYSICIAN'S   PROGRESS REPORT   CERTIFICATION OF DISABILITY Claim Number:     Social Security Number:    Patient’s Name: Orestes Lopez Date of Injury: 1/14/2025   Employer: [employer] Name of MCO (if applicable):      Patient’s Job Description/Occupation:        Previous Injuries/Diseases/Surgeries Contributing to the Condition:  See previous documentation      Diagnosis: (K40.90) Unilateral inguinal hernia without obstruction or gangrene, recurrence not specified      Related to the Industrial Injury? Yes     Explain: See previous documentation      Objective Medical Findings: Left groin without discoloration, bulging, swelling, or TTP         None - Discharged                         Stable  Yes                 Ratable  No     X   Generally Improved                         Condition Worsened               X   Condition Same  May Have Suffered a Permanent Disability No     Treatment Plan:    Surgery consult referral pending. Continue previous recommended therapy and restrictions. Follow up in 1-2 weeks. Monitor. Seek immediate medical attention if symptoms change/worsen.          No Change in Therapy                  PT/OT Prescribed                      Medication May be Used While Working        Case Management                          PT/OT Discontinued    Consultation    Further Diagnostic Studies:    Prescription(s)                 Released to FULL DUTY /No Restrictions on (Date):       Certified TOTALLY TEMPORARILY DISABLED (Indicate Dates) From:   To:    X  Released to RESTRICTED/Modified Duty on (Date): From: 1/28/2025 To: 2/11/2025  Restrictions Are:  Temporary      No Sitting    No Standing    No Pulling Other:         No Bending at Waist     No Stooping     No Lifting        No Carrying     No Walking Lifting Restricted to (lbs.):  < or = to 25 pounds       No Pushing        No Climbing     No Reaching Above Shoulders       Date of Next  Visit:  2/11/2025    1-2 weeks Date of this Exam: 1/28/2025 Physician/Chiropractic Physician Name: MARGARITO Vazquez Physician/Chiropractic Physician Signature:  Bhavik Donahue DO MPH     Welcome:  [facility]  [facility], [address] [place], [place] [address] - Telephone [phone] [place]:  [address] [place] [address], [address] [place], [place] [address] - Telephone [phone]    [email or web address]  D-39 (Rev. 10/24)

## 2025-01-28 NOTE — PROGRESS NOTES
"Subjective:     Orestes Lopez is a 29 y.o. male who presents for Follow-Up (Workers comp hernia)        DOI: 1/14/2025.  follow up #2.    Still awaiting general surgery referral approval for consultation on left inguinal hernia.  Otherwise, condition stable and unchanged.  Patient denies bulging, swelling, increase in pain, or discoloration.  Patient working within restrictions.    Review of Systems   Genitourinary: Negative.    All other systems reviewed and are negative.  Refer to [facility] for additional details.    During this visit, appropriate PPE was worn and hand hygiene was performed.    PMH: No pertinent past medical history to this problem  MEDS: Medications were reviewed in Epic  ALLERGIES: Allergies were reviewed in Epic  SOCHX: Works as an  at [employer]  FH: No pertinent family history to this problem      Objective:     /70 (BP Location: Left arm, Patient Position: Sitting, BP Cuff Size: Adult)   Pulse 70   Temp 36.8 °C (98.3 °F) (Temporal)   Resp 16   Ht 1.93 m (6' 4\")   Wt 86.5 kg (190 lb 11.2 oz)   SpO2 99%   BMI 23.21 kg/m²     Physical Exam  Nursing note reviewed.   Constitutional:       General: He is not in acute distress.     Appearance: He is well-developed. He is not ill-appearing or toxic-appearing.   Eyes:      General: Vision grossly intact.   Cardiovascular:      Rate and Rhythm: Normal rate.   Pulmonary:      Effort: Pulmonary effort is normal. No respiratory distress.   Genitourinary:     Comments: Left inguinal region without discoloration, significant bulging, or TTP  Musculoskeletal:         General: No deformity. Normal range of motion.   Skin:     General: Skin is warm and dry.      Coloration: Skin is not pale.      Findings: No bruising or erythema.   Neurological:      Mental Status: He is alert and oriented to person, place, and time.      Motor: No weakness.   Psychiatric:         Behavior: Behavior normal. Behavior is cooperative.     US-INGUINAL " HERNIA  Order: [id number]   Status: Final result       Visible to patient: Yes (seen)       Next appt: None       Dx: Unilateral inguinal hernia without ob...    0 Result Notes  Details    Reading Physician Reading Date Result Priority   Marcus Benjamin M.D.  [phone] 1/14/2025      Narrative & Impression     1/14/2025 4:32 PM     HISTORY/REASON FOR EXAM:  History of left inguinal hernia  Pain     TECHNIQUE/EXAM DESCRIPTION AND NUMBER OF VIEWS:  Left inguinal ultrasound with and without Valsalva maneuver.     COMPARISON: None     FINDINGS:     Left inguinal hernia is identified which is nonreducible and contains only abdominal fat no bowel loops. Neck of the hernia measures 0.9 cm. Hernia measures 2.1 x 1.6 x 1.3 cm.     There is no mass or fluid collection.     IMPRESSION:     1.  Exam is positive for left inguinal hernia containing only intra-abdominal fat.           Exam Ended: 01/14/25  4:55 PM Last Resulted: 01/14/25  5:00 PM       Assessment/Plan:     1. Unilateral inguinal hernia without obstruction or gangrene, recurrence not specified    Surgery consult referral pending. Continue previous recommended therapy and restrictions. Follow up in 1-2 weeks. Monitor. Seek immediate medical attention if symptoms change/worsen.     Monitor. Warning signs reviewed. Immediate return/ER precautions advised.     Differential diagnosis, natural history, supportive care, over-the-counter symptom management per 's instructions, close monitoring, and indications for immediate follow-up discussed.     All questions answered. Patient agrees with the plan of care.    Discharge summary provided via [employer].

## 2025-02-18 ENCOUNTER — OCCUPATIONAL MEDICINE (OUTPATIENT)
Dept: URGENT CARE | Facility: CLINIC | Age: 30
End: 2025-02-18
Payer: COMMERCIAL

## 2025-02-18 VITALS
RESPIRATION RATE: 16 BRPM | HEART RATE: 86 BPM | DIASTOLIC BLOOD PRESSURE: 70 MMHG | TEMPERATURE: 98 F | HEIGHT: 76 IN | OXYGEN SATURATION: 97 % | WEIGHT: 190.48 LBS | SYSTOLIC BLOOD PRESSURE: 110 MMHG | BODY MASS INDEX: 23.2 KG/M2

## 2025-02-18 DIAGNOSIS — K40.90 UNILATERAL INGUINAL HERNIA WITHOUT OBSTRUCTION OR GANGRENE, RECURRENCE NOT SPECIFIED: ICD-10-CM

## 2025-02-18 PROCEDURE — 99213 OFFICE O/P EST LOW 20 MIN: CPT

## 2025-02-18 PROCEDURE — 3078F DIAST BP <80 MM HG: CPT

## 2025-02-18 PROCEDURE — 3074F SYST BP LT 130 MM HG: CPT

## 2025-02-18 NOTE — LETTER
PHYSICIAN’S AND CHIROPRACTIC PHYSICIAN'S   PROGRESS REPORT   CERTIFICATION OF DISABILITY Claim Number:     Social Security Number:    Patient’s Name: Orestes Lopez Date of Injury: 1/14/2025   Employer: [employer] Name of MCO (if applicable):      Patient’s Job Description/Occupation:        Previous Injuries/Diseases/Surgeries Contributing to the Condition:  No      Diagnosis: (K40.90) Unilateral inguinal hernia without obstruction or gangrene, recurrence not specified      Related to the Industrial Injury? Yes     Explain: Patient was lifting materials at work when he felt a sudden pain in his left lower abdomen/inguinal area with small bulge/outpouching at that time.      Objective Medical Findings: Inguinal area and abdomen negative for any palpable or visual hernias and masses.  Bowel sounds WNL throughout all 4 quadrants.  Negative tenderness to palpation of bilateral inguinal area and abdominal quadrants.         None - Discharged                         Stable  No                 Ratable  No     X   Generally Improved                         Condition Worsened                  Condition Same  May Have Suffered a Permanent Disability No     Treatment Plan:    Trial for full duty work without work restrictions  Continue OTC analgesics  Follow-up with general surgery per previous referral - phone number to referral department provided to patient for contact.  Follow-up in 5 to 6 days to check status of hernia following being on full duty         No Change in Therapy                  PT/OT Prescribed                      Medication May be Used While Working        Case Management                          PT/OT Discontinued    Consultation    Further Diagnostic Studies:    Prescription(s)               X  Released to FULL DUTY /No Restrictions on (Date):  2/18/2025    Certified TOTALLY TEMPORARILY DISABLED (Indicate Dates) From:   To:      Released to RESTRICTED/Modified Duty on (Date):  From:   To:    Restrictions Are:         No Sitting    No Standing    No Pulling Other:         No Bending at Waist     No Stooping     No Lifting        No Carrying     No Walking Lifting Restricted to (lbs.):          No Pushing        No Climbing     No Reaching Above Shoulders       Date of Next Visit:  2/24/2025 Date of this Exam: 2/18/2025 Physician/Chiropractic Physician Name: MARGARITO Gamble Physician/Chiropractic Physician Signature:  Bhavik Donahue DO MPH     [place]:  [address] [place] [address], [address] [place], [place] [address] - Telephone [phone] [place]:  [address] [place] [address], [address] [place], [place] [address] - Telephone [phone]    [email or web address]  D-39 (Rev. 10/24)

## 2025-02-18 NOTE — PROGRESS NOTES
"Subjective:     Orestes Lopez is a 29 y.o. male who presents for Follow-Up (Hernia feels better)    Patient presents for work comp follow-up regarding a left inguinal hernia, visit #3.  Initial visit on 1/14/2025.    Patient presents today stating he has complete resolution of symptoms - no pain, lump or bulge in the left abdominal/inguinal area, or issues urinating or stooling. Has been following work restrictions properly. Has not heard from Gen. Surgery for f/u. Patient would like to be full-duty without work restrictions as he feels like he is ready for this given his complete lack of symptoms.       PMH:   No pertinent past medical history to this problem  MEDS:  Medications were reviewed in EMR  ALLERGIES:  Allergies were reviewed in EMR  SOCHX:  Works as a   FH:   No pertinent family history to this problem       Objective:     /70 (BP Location: Left arm, Patient Position: Sitting, BP Cuff Size: Adult)   Pulse 86   Temp 36.7 °C (98 °F) (Temporal)   Resp 16   Ht 1.93 m (6' 4\")   Wt 86.4 kg (190 lb 7.6 oz)   SpO2 97%   BMI 23.19 kg/m²      Inguinal area and abdomen negative for any palpable or visual hernias and masses.  Bowel sounds WNL throughout all 4 quadrants.  Negative tenderness to palpation of bilateral inguinal area and all 4 abdominal quadrants    Assessment/Plan:       1. Unilateral inguinal hernia without obstruction or gangrene, recurrence not specified      FROM   TO     Trial for full duty work without work restrictions  Continue OTC analgesics  Follow-up with general surgery per previous referral - phone number to referral department provided to patient for contact.  Follow-up in 5 to 6 days to check status of hernia following being on full duty       Differential diagnosis, natural history, supportive care, and indications for immediate follow-up discussed.    "

## 2025-02-20 ENCOUNTER — OFFICE VISIT (OUTPATIENT)
Dept: URGENT CARE | Facility: PHYSICIAN GROUP | Age: 30
End: 2025-02-20
Payer: COMMERCIAL

## 2025-02-20 VITALS
OXYGEN SATURATION: 98 % | RESPIRATION RATE: 16 BRPM | TEMPERATURE: 100 F | BODY MASS INDEX: 22.19 KG/M2 | HEART RATE: 92 BPM | DIASTOLIC BLOOD PRESSURE: 70 MMHG | WEIGHT: 182.2 LBS | SYSTOLIC BLOOD PRESSURE: 108 MMHG | HEIGHT: 76 IN

## 2025-02-20 DIAGNOSIS — J22 LOWER RESPIRATORY TRACT INFECTION: ICD-10-CM

## 2025-02-20 PROCEDURE — 3078F DIAST BP <80 MM HG: CPT | Performed by: NURSE PRACTITIONER

## 2025-02-20 PROCEDURE — 99213 OFFICE O/P EST LOW 20 MIN: CPT | Performed by: NURSE PRACTITIONER

## 2025-02-20 PROCEDURE — 3074F SYST BP LT 130 MM HG: CPT | Performed by: NURSE PRACTITIONER

## 2025-02-20 RX ORDER — PREDNISONE 10 MG/1
40 TABLET ORAL DAILY
Qty: 20 TABLET | Refills: 0 | Status: SHIPPED | OUTPATIENT
Start: 2025-02-20 | End: 2025-02-25

## 2025-02-20 RX ORDER — AMOXICILLIN 500 MG/1
500 CAPSULE ORAL 2 TIMES DAILY
Qty: 20 CAPSULE | Refills: 0 | Status: SHIPPED | OUTPATIENT
Start: 2025-02-20 | End: 2025-03-02

## 2025-02-20 RX ORDER — DEXTROMETHORPHAN HYDROBROMIDE AND PROMETHAZINE HYDROCHLORIDE 15; 6.25 MG/5ML; MG/5ML
5 SYRUP ORAL EVERY 6 HOURS PRN
Qty: 100 ML | Refills: 0 | Status: SHIPPED | OUTPATIENT
Start: 2025-02-20

## 2025-02-20 RX ORDER — BENZONATATE 100 MG/1
100 CAPSULE ORAL 3 TIMES DAILY PRN
Qty: 30 CAPSULE | Refills: 0 | Status: SHIPPED | OUTPATIENT
Start: 2025-02-20 | End: 2025-03-02

## 2025-02-20 RX ORDER — ALBUTEROL SULFATE 90 UG/1
2 INHALANT RESPIRATORY (INHALATION) EVERY 6 HOURS PRN
Qty: 8.5 G | Refills: 0 | Status: SHIPPED | OUTPATIENT
Start: 2025-02-20

## 2025-02-20 NOTE — LETTER
February 20, 2025       Patient: Orestes Lopez   YOB: 1995   Date of Visit: 2/20/2025         To Whom It May Concern:    In my medical opinion, I recommend that Orestes Lopez be excused from work from 2/20/2025 through 2/22/2025 due to illness.     If you have any questions or concerns, please don't hesitate to call [phone]          Sincerely,          PURVI Haynes.P.R.N.  Electronically Signed

## 2025-02-21 NOTE — PROGRESS NOTES
Verbal consent was acquired by the patient to use [employer] ambient listening note generation during this visit          Chief Complaint   Patient presents with    Pharyngitis     Ore throat, cough, congestion, body aches, chills, sneezing, headache, hot and cold  X 2 weeks           History of Present Illness  The patient is a 29-year-old male who presents for evaluation of cough.    He has been experiencing persistent symptoms for the past 12 days, with no signs of improvement. He reports a sore throat that is most severe during the early morning and late-night hours. His symptoms appear to alleviate slightly during the day when he maintains nasal hygiene but tend to worsen at night and upon waking. He also reports a productive cough, yielding thick, yellowish sputum. He has no known allergies. He has been managing his symptoms with NyQuil at night to facilitate sleep and Airborne vitamins during the day. He was previously prescribed cough medicine in June 2024 but does not recall its efficacy. He has no known sick contacts. His work schedule is Monday through Saturday, with 10-hour shifts. He worked yesterday and today until 9:30 AM, after which he was sent home by his employer. He has a past medical history of asthma but has not required an inhaler for approximately 10 to 15 years.    ALLERGIES  The patient has no known allergies.    MEDICATIONS  NyQuil, Airborne vitamins         ROS:    No severe shortness of breath   No cardiac like chest pain, as discussed   As otherwise stated in HPI    Medical/SX/ Social History:  Reviewed per chart    Pertinent Medications:    No current outpatient medications on file prior to visit.     No current facility-administered medications on file prior to visit.        Allergies:    Patient has no known allergies.     Problem list, medications, and allergies reviewed by myself today in Epic     Physical Exam:    Vitals:    02/20/25 1533   BP: 108/70   Pulse: 92   Resp: 16    Temp: 37.8 °C (100 °F)   SpO2: 98%             Physical Exam  Vitals and nursing note reviewed.   Constitutional:       General: He is not in acute distress.     Appearance: Normal appearance. He is ill-appearing. He is not toxic-appearing or diaphoretic.   HENT:      Head: Normocephalic and atraumatic.      Right Ear: Tympanic membrane, ear canal and external ear normal.      Left Ear: Tympanic membrane, ear canal and external ear normal.      Nose: Congestion and rhinorrhea present.      Mouth/Throat:      Mouth: Mucous membranes are moist.      Pharynx: Pharyngeal swelling and posterior oropharyngeal erythema present. No oropharyngeal exudate, uvula swelling or postnasal drip.   Eyes:      Extraocular Movements: Extraocular movements intact.      Conjunctiva/sclera: Conjunctivae normal.      Pupils: Pupils are equal, round, and reactive to light.   Cardiovascular:      Rate and Rhythm: Normal rate and regular rhythm.      Pulses: Normal pulses.      Heart sounds: Normal heart sounds.   Pulmonary:      Effort: Pulmonary effort is normal.      Breath sounds: Decreased air movement present. Examination of the right-upper field reveals decreased breath sounds, wheezing and rhonchi. Examination of the left-upper field reveals decreased breath sounds, wheezing and rhonchi. Examination of the right-middle field reveals decreased breath sounds and wheezing. Examination of the right-lower field reveals decreased breath sounds and wheezing. Examination of the left-lower field reveals decreased breath sounds. Decreased breath sounds, wheezing and rhonchi present.   Musculoskeletal:         General: Normal range of motion.      Cervical back: Normal range of motion and neck supple. Tenderness present.   Lymphadenopathy:      Cervical: Cervical adenopathy present.   Skin:     General: Skin is warm.      Capillary Refill: Capillary refill takes less than 2 seconds.   Neurological:      General: No focal deficit present.       Mental Status: He is alert and oriented to person, place, and time.            Medical Decision making and plan :  I personally reviewed prior external notes and test results pertinent to today's visit. Pt is clinically stable at today's acute urgent care visit.  Patient appears nontoxic with no acute distress noted. Appropriate for outpatient care at this time.    Pleasant 29 y.o. male presented clinic with:     Assessment & Plan  1.  LRTI, acute.   He has been experiencing symptoms for approximately 12 days, including a sore throat, yellowish thick mucus, and fatigue. Examination revealed redness and swelling in the throat. An albuterol inhaler will be prescribed to aid in shortness of breath, to be used as needed, but recommended three times a day initially. An antibiotic will be prescribed to be taken twice daily for one week. Steroids will be prescribed to be taken once daily for five days. Promethazine cough syrup will be prescribed to be used as needed. A work note has been provided, excusing him from work until Saturday. He is advised to rest and monitor his symptoms closely. All prescriptions have been sent to the [facility] and formerly [facility]. If there are any issues with obtaining the medications, he should contact the clinic. If his condition worsens, he should return to the clinic or visit the ER.      Shared decision-making was utilized with patient for treatment plan. Medication discussed included indication for use and the potential benefits and side effects. Education was provided regarding the aforementioned assessments.  Differential Diagnosis, natural history, and supportive care discussed. All of the patient's questions were answered to their satisfaction at the time of discharge. Patient was encouraged to monitor symptoms closely. Those signs and symptoms which would warrant concern and mandate seeking a higher level of service through the emergency department discussed at length.   Patient stated agreement and understanding of this plan of care.    Disposition:  Home in stable condition     Voice Recognition Disclaimer:  Portions of this document were created using voice recognition software and [employer] technology provided by [facility]. The software does have a chance of producing errors of grammar and possibly content. I have made every reasonable attempt to correct obvious errors, but there may be errors of grammar and possibly content that I did not discover before finalizing the  documentation.    SHAYLA Haynes.